# Patient Record
Sex: MALE | Race: WHITE | NOT HISPANIC OR LATINO
[De-identification: names, ages, dates, MRNs, and addresses within clinical notes are randomized per-mention and may not be internally consistent; named-entity substitution may affect disease eponyms.]

---

## 2018-09-19 PROBLEM — Z00.00 ENCOUNTER FOR PREVENTIVE HEALTH EXAMINATION: Status: ACTIVE | Noted: 2018-09-19

## 2018-09-28 ENCOUNTER — APPOINTMENT (OUTPATIENT)
Dept: OPHTHALMOLOGY | Facility: CLINIC | Age: 68
End: 2018-09-28
Payer: COMMERCIAL

## 2018-09-28 DIAGNOSIS — H40.003 PREGLAUCOMA, UNSPECIFIED, BILATERAL: ICD-10-CM

## 2018-09-28 DIAGNOSIS — H35.372 PUCKERING OF MACULA, LEFT EYE: ICD-10-CM

## 2018-09-28 PROCEDURE — 92020 GONIOSCOPY: CPT

## 2018-09-28 PROCEDURE — 92083 EXTENDED VISUAL FIELD XM: CPT | Mod: 26

## 2018-09-28 PROCEDURE — 92134 CPTRZ OPH DX IMG PST SGM RTA: CPT

## 2018-09-28 PROCEDURE — 92002 INTRM OPH EXAM NEW PATIENT: CPT

## 2020-07-31 ENCOUNTER — EMERGENCY (EMERGENCY)
Facility: HOSPITAL | Age: 70
LOS: 1 days | Discharge: HOME | End: 2020-07-31
Attending: EMERGENCY MEDICINE | Admitting: EMERGENCY MEDICINE
Payer: COMMERCIAL

## 2020-07-31 VITALS
SYSTOLIC BLOOD PRESSURE: 136 MMHG | OXYGEN SATURATION: 98 % | WEIGHT: 151.9 LBS | HEIGHT: 72 IN | HEART RATE: 80 BPM | RESPIRATION RATE: 18 BRPM | TEMPERATURE: 98 F | DIASTOLIC BLOOD PRESSURE: 83 MMHG
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
## 2020-07-31 DIAGNOSIS — S93.602A UNSPECIFIED SPRAIN OF LEFT FOOT, INITIAL ENCOUNTER: ICD-10-CM
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
## 2020-07-31 DIAGNOSIS — M25.572 PAIN IN LEFT ANKLE AND JOINTS OF LEFT FOOT: ICD-10-CM

## 2020-07-31 PROCEDURE — 99283 EMERGENCY DEPT VISIT LOW MDM: CPT | Mod: 25

## 2020-07-31 PROCEDURE — 73610 X-RAY EXAM OF ANKLE: CPT | Mod: 26,LT

## 2020-07-31 PROCEDURE — 99284 EMERGENCY DEPT VISIT MOD MDM: CPT

## 2020-07-31 PROCEDURE — 73620 X-RAY EXAM OF FOOT: CPT | Mod: 26,LT

## 2020-07-31 PROCEDURE — 73562 X-RAY EXAM OF KNEE 3: CPT | Mod: 26,LT

## 2020-07-31 PROCEDURE — 73630 X-RAY EXAM OF FOOT: CPT | Mod: 26,LT

## 2020-07-31 PROCEDURE — 73562 X-RAY EXAM OF KNEE 3: CPT

## 2020-07-31 PROCEDURE — 73610 X-RAY EXAM OF ANKLE: CPT

## 2020-07-31 PROCEDURE — 73630 X-RAY EXAM OF FOOT: CPT

## 2020-07-31 NOTE — ED PROVIDER NOTE - OBJECTIVE STATEMENT
states that he was side-stepping down stairs (left before the right) and missed a step, twisting his left ankle/foot/ states that he did not have pain in the area after the injury. states that over time the area became more painful and swollen/bruising. no numbness or tingling or weakness. states that he spoke with his doctor who recommend he come tot  ed for further evaluation/xrays. does not endorse AC use. no pain elsewhere. states that he was side-stepping down stairs (left before the right) and missed a step, twisting his left ankle/foot/ states that he did not have pain in the area after the injury. states that over time the area became more painful and swollen/bruising. no numbness or tingling or weakness. states that he spoke with his doctor who recommend he come to the ed for further evaluation/xrays. does not endorse AC use. no pain elsewhere.

## 2020-07-31 NOTE — ED PROVIDER NOTE - PATIENT PORTAL LINK FT
You can access the FollowMyHealth Patient Portal offered by [facility] by registering at the following website: [email or web address]. By joining [employer]’s FollowMyHealth portal, you will also be able to view your health information using other applications (apps) compatible with our system.

## 2020-07-31 NOTE — ED PROVIDER NOTE - CARE PROVIDER_API CALL
Crescencio Landin  PODIATRIC MEDICINE AND SURGERY  [address] [address]  Phone: [phone]  Fax: [phone]  Follow Up Time:

## 2020-07-31 NOTE — ED PROVIDER NOTE - PHYSICAL EXAMINATION
General Appearance: Patient is well developed and well nourished. Patient is lying in stretcher, not diaphoretic and does not appear in acute distress.      Pulm: Chest expansion symmetric bilaterally without evidence of retraction.       MSK: ttp left lateral cuboid/proximal 4/5 MTP. No noted tenderness to palpation of the medial/lateral malleolous, anterior joint. No tenderness to palpation of the metatarsals or phalanges of the foot. No evidence of ecchymosis, deformity or edema. Full ROM ankle flexion, extension, inversion and eversion. Full ROM hip and knee.      Neuro: Distal sensation intact in arms and legs bilaterally. Sensory Knee and ankle reflexes 2+ and symmetric bilaterally. gait steady. gcs 15    psych: mood and affect appropriate.     skin: warm dry and intact- no note of erythema edema purpura petechia ecchymosis

## 2020-07-31 NOTE — ED PROVIDER NOTE - DIAGNOSTIC INTERPRETATION
ER Physician Assistant: left ankle/foot (reviewed with radiology)  INTERPRETATION: no acute fracture; soft tissue swelling noted; normal bony alignment.

## 2020-07-31 NOTE — ED ADULT TRIAGE NOTE - ARRIVAL INFO ADDITIONAL COMMENTS
states he missed a step 4 hours ago and injured left ankle. swelling noted to left ankle. denies any numbness/tingling to extremity. states he took 2 Aleves 30 min ago with relief

## 2020-07-31 NOTE — ED PROVIDER NOTE - NSFOLLOWUPINSTRUCTIONS_ED_ALL_ED_FT
FOOT SPRAIN - AfterCare(R) Instructions(ER/ED)     Foot Sprain    WHAT YOU NEED TO KNOW:    A foot sprain is a stretched or torn ligament in the foot or toe. Ligaments are tough tissues that connect bones.    DISCHARGE INSTRUCTIONS:    Return to the emergency department if:     You have numbness or tingling below the injury, such as in your toes.      The skin on your injured foot is blue or pale.      You have increased pain, even after you take pain medicine.    Call your doctor if:     You have new weakness in your foot.      You have new or increased swelling in your foot.      You have new or increased stiffness when you move your injured foot.      You have questions or concerns about your condition or care.    Medicines:     NSAIDs, such as ibuprofen, help decrease swelling, pain, and fever. This medicine is available with or without a doctor's order. NSAIDs can cause stomach bleeding or kidney problems in certain people. If you take blood thinner medicine, always ask if NSAIDs are safe for you. Always read the medicine label and follow directions. Do not give these medicines to children under 6 months of age without direction from your child's healthcare provider.      Take your medicine as directed. Contact your healthcare provider if you think your medicine is not helping or if you have side effects. Tell him of her if you are allergic to any medicine. Keep a list of the medicines, vitamins, and herbs you take. Include the amounts, and when and why you take them. Bring the list or the pill bottles to follow-up visits. Carry your medicine list with you in case of an emergency.    Self-care:     Rest your foot. Limit movement in your sprained foot for the first 2 to 3 days. You might need crutches to take weight off your injured foot as it heals. Use crutches as directed.Walking with Crutches           Apply ice on your foot for 15 to 20 minutes every hour or as directed. Use an ice pack, or put crushed ice in a plastic bag. Cover it with a towel. Ice helps prevent tissue damage and decreases swelling and pain.      Compress your foot. You may need to use tape or an elastic bandage to support your foot if you have a mild sprain. You may need a splint on your foot for support if your sprain is severe. Wear your splint for as many days as directed.      Elevate your foot above the level of your heart as often as you can. This will help decrease swelling and pain. Prop your foot on pillows or blankets to keep it elevated comfortably. Elevate Leg         Exercise your foot: You may be given exercises to improve your strength and to help decrease stiffness. The exercises and physical therapy can help restore strength and increase the range of motion in your foot. Ask your healthcare provider when you can return to your normal activities or play sports.    Prevent another foot sprain:     Warm up and stretch before you exercise.      Do not exercise when you feel pain or are tired.      Wear equipment to protect yourself when you play sports.    Follow up with your doctor as directed: Write down your questions so you remember to ask them during your visits.       © Copyright [employer] 2020       back to top                      © Copyright [employer] 2020

## 2020-07-31 NOTE — ED PROVIDER NOTE - ATTENDING CONTRIBUTION TO CARE
states that he was side-stepping down stairs (left before the right) and missed a step, twisting his left ankle/foot/ states that he did not have pain in the area after the injury. states that over time the area became more painful and swollen/bruising. no numbness or tingling or weakness. states that he spoke with his doctor who recommend he come to the ed for further evaluation/xrays. does not endorse AC use. no pain elsewhere.    Agree with HPI and PE as documented by PA  Pt with ankle injury  No distal nv deficit  Xrays without evidence of fx  Supportive care and follow up with ortho

## 2020-07-31 NOTE — ED PROVIDER NOTE - CLINICAL SUMMARY MEDICAL DECISION MAKING FREE TEXT BOX
This is a pleasant 71 year old male presenting to the ed with a left foot/ankle injury.s ttes that he was side stepping down steps twisting his left foot/ankle. pe patietn appears well non-toxic. ttp lateral left foot over cuboid/proximal 4/5 mtp. associated swelling. pt took aleeve with improvement of pain. plan for xray This is a pleasant 71 year old male presenting to the ed with a left foot/ankle injury.s ttes that he was side stepping down steps twisting his left foot/ankle. pe patietn appears well non-toxic. ttp lateral left foot over cuboid/proximal 4/5 mtp. associated swelling. pt took aleeve with improvement of pain. plan for xray which shows soft tissue swelling no fracture. recommend ice/ace and hard shoe. At this time, the evidence for any other entities in the differential is insufficient to justify any further testing. This was discussed and explained to the patient. It was advised that persistent or worsening symptoms would require further evaluation. This was discussed with the patient and family using shared decision making. ED evaluation and management discussed with the patient and family (if available) in detail.  Close PMD follow up encouraged.  Strict ED return instructions discussed in detail and patient given the opportunity to ask any questions about their discharge diagnosis and instructions. Patient verbalized understanding. Patient is agreeable to plan. This is a pleasant 71 year old male presenting to the ed with a left foot/ankle injury. states that he was side stepping down steps twisting his left foot/ankle. pe patietn appears well non-toxic. ttp lateral left foot over cuboid/proximal 4/5 mtp. associated swelling. pt took aleeve with improvement of pain. plan for xray which shows soft tissue swelling no fracture. recommend ice/ace and hard shoe. At this time, the evidence for any other entities in the differential is insufficient to justify any further testing. This was discussed and explained to the patient. It was advised that persistent or worsening symptoms would require further evaluation. This was discussed with the patient and family using shared decision making. ED evaluation and management discussed with the patient and family (if available) in detail.  Close PMD follow up encouraged.  Strict ED return instructions discussed in detail and patient given the opportunity to ask any questions about their discharge diagnosis and instructions. Patient verbalized understanding. Patient is agreeable to plan.

## 2020-07-31 NOTE — ED ADULT NURSE NOTE - OBJECTIVE STATEMENT
Received pt awake alert and oriented x 3, KENNEDY. Pt present to the ED C/O left ankle pain. Noted swelling on the left foot and ankle. + pedal pulse. Vss, afebrile. will continue to monitor

## 2020-12-21 NOTE — ED ADULT NURSE NOTE - CAS EDN DISCHARGE ASSESSMENT
Patient last seen on 09/16/20, and to return in one month no appointment pending at this time.  Last refill done on 09/16/20 # 90 x 0 refills.  Rx faxed to pharmacy.  Reception please call patient to schedule blood pressure follow up.    Alert and oriented to person, place and time

## 2022-07-19 PROBLEM — H40.003 GLAUCOMA SUSPECT OF BOTH EYES: Status: ACTIVE | Noted: 2018-09-28

## 2023-06-25 NOTE — ED PROVIDER NOTE - NS ED ATTENDING STATEMENT MOD
I have personally performed a face to face diagnostic evaluation on this patient. I have reviewed the ACP note and agree with the history, exam and plan of care, except as noted. None

## 2024-09-20 ENCOUNTER — NON-APPOINTMENT (OUTPATIENT)
Age: 74
End: 2024-09-20

## 2024-09-23 ENCOUNTER — APPOINTMENT (OUTPATIENT)
Dept: OPHTHALMOLOGY | Facility: CLINIC | Age: 74
End: 2024-09-23
Payer: COMMERCIAL

## 2024-09-23 ENCOUNTER — NON-APPOINTMENT (OUTPATIENT)
Age: 74
End: 2024-09-23

## 2024-09-23 PROCEDURE — 92083 EXTENDED VISUAL FIELD XM: CPT | Mod: 26

## 2024-09-23 PROCEDURE — 92133 CPTRZD OPH DX IMG PST SGM ON: CPT

## 2024-09-23 PROCEDURE — 92004 COMPRE OPH EXAM NEW PT 1/>: CPT
